# Patient Record
Sex: MALE | Race: WHITE | NOT HISPANIC OR LATINO | Employment: UNEMPLOYED | URBAN - METROPOLITAN AREA
[De-identification: names, ages, dates, MRNs, and addresses within clinical notes are randomized per-mention and may not be internally consistent; named-entity substitution may affect disease eponyms.]

---

## 2024-02-12 ENCOUNTER — OFFICE VISIT (OUTPATIENT)
Dept: PRIMARY CARE | Facility: CLINIC | Age: 24
End: 2024-02-12
Payer: COMMERCIAL

## 2024-02-12 VITALS
OXYGEN SATURATION: 98 % | HEART RATE: 85 BPM | SYSTOLIC BLOOD PRESSURE: 120 MMHG | WEIGHT: 310 LBS | DIASTOLIC BLOOD PRESSURE: 70 MMHG | TEMPERATURE: 97.4 F

## 2024-02-12 DIAGNOSIS — Z01.89 ENCOUNTER FOR ROUTINE LABORATORY TESTING: ICD-10-CM

## 2024-02-12 DIAGNOSIS — R09.A2 SENSATION OF LUMP IN THROAT: Primary | ICD-10-CM

## 2024-02-12 DIAGNOSIS — E66.9 OBESITY WITHOUT SERIOUS COMORBIDITY, UNSPECIFIED CLASSIFICATION, UNSPECIFIED OBESITY TYPE: ICD-10-CM

## 2024-02-12 PROBLEM — N39.44 NOCTURNAL ENURESIS: Status: ACTIVE | Noted: 2024-02-12

## 2024-02-12 PROBLEM — F91.3 OPPOSITIONAL DEFIANT DISORDER: Status: ACTIVE | Noted: 2024-02-12

## 2024-02-12 PROBLEM — M21.42 FLAT FEET, BILATERAL: Status: ACTIVE | Noted: 2024-02-12

## 2024-02-12 PROBLEM — M21.41 FLAT FEET, BILATERAL: Status: ACTIVE | Noted: 2024-02-12

## 2024-02-12 PROBLEM — F32.1 MAJOR DEPRESSIVE DISORDER, SINGLE EPISODE, MODERATE (MULTI): Status: ACTIVE | Noted: 2024-02-12

## 2024-02-12 PROBLEM — F90.0 ADHD, PREDOMINANTLY INATTENTIVE TYPE: Status: ACTIVE | Noted: 2024-02-12

## 2024-02-12 PROCEDURE — 1036F TOBACCO NON-USER: CPT | Performed by: LICENSED PRACTICAL NURSE

## 2024-02-12 PROCEDURE — 99213 OFFICE O/P EST LOW 20 MIN: CPT | Performed by: LICENSED PRACTICAL NURSE

## 2024-02-12 ASSESSMENT — ENCOUNTER SYMPTOMS
DEPRESSION: 0
OCCASIONAL FEELINGS OF UNSTEADINESS: 0
NERVOUS/ANXIOUS: 1
LOSS OF SENSATION IN FEET: 0

## 2024-02-12 ASSESSMENT — PATIENT HEALTH QUESTIONNAIRE - PHQ9
SUM OF ALL RESPONSES TO PHQ9 QUESTIONS 1 AND 2: 0
1. LITTLE INTEREST OR PLEASURE IN DOING THINGS: NOT AT ALL
2. FEELING DOWN, DEPRESSED OR HOPELESS: NOT AT ALL

## 2024-02-12 ASSESSMENT — PAIN SCALES - GENERAL: PAINLEVEL: 0-NO PAIN

## 2024-02-12 NOTE — PATIENT INSTRUCTIONS
It was nice meeting you today Lionel.  I am sorry you have been experiencing this abnormality in your neck. Lets get an ultrasound of your neck and some blood work to get a better look at what's going on. Additionally I will order some blood work to check for diabetes as well and we will see you back in 2 weeks.

## 2024-02-12 NOTE — PROGRESS NOTES
[facility]: [facility]  PROGRESS NOTE      Lionel Roldan Bean is a 23 y.o. male that is presenting today for Establish Care (Pt states that he feels something under his bernardo apples ).      Subjective   Pt presents to the office today for concerns of thyroid enlargement. He notes feeling a lump to the front of his throat and has increased concern for thyroid abnormalities. He shares that he has increased worry about his general health and is not sure if this is anxiety driven, or related to this change he feels in his throat. He denies changes in heat or cold tolerance, his weight, or energy level. He reports no palpitations and notes no changes in his ability to swallow.     Mr. Bean shares that he understands that his increased height to weight ration puts him at risk for chronic medication conditions such as DM. He is interested in having his A1C checked as well.        Review of Systems   Constitutional: Negative.    Endocrine: Negative.         Lump in the throat   Psychiatric/Behavioral:  The patient is nervous/anxious.       Objective   Vitals:    02/12/24 1638   BP: 120/70   Pulse: 85   Temp: 36.3 °C (97.4 °F)   SpO2: 98%      There is no height or weight on file to calculate BMI.  Physical Exam  Constitutional:       General: He is not in acute distress.     Appearance: He is not ill-appearing, toxic-appearing or diaphoretic.   Neck:      Thyroid: No thyroid mass, thyromegaly or thyroid tenderness.   Cardiovascular:      Rate and Rhythm: Normal rate and regular rhythm.   Pulmonary:      Effort: No respiratory distress.      Breath sounds: No wheezing or rales.   Abdominal:      General: Bowel sounds are normal.      Palpations: Abdomen is soft.   Musculoskeletal:      Cervical back: No edema, erythema, signs of trauma, rigidity, tenderness or crepitus. No pain with movement. Normal range of motion.   Lymphadenopathy:      Cervical: No cervical adenopathy.      Right cervical:  "No superficial, deep or posterior cervical adenopathy.     Left cervical: No superficial, deep or posterior cervical adenopathy.   Skin:     General: Skin is warm and dry.       Diagnostic Results   No results found for: \"GLUCOSE\", \"CALCIUM\", \"NA\", \"K\", \"CO2\", \"CL\", \"BUN\", \"CREATININE\"  No results found for: \"ALT\", \"AST\", \"GGT\", \"ALKPHOS\", \"BILITOT\"  No results found for: \"WBC\", \"HGB\", \"HCT\", \"MCV\", \"PLT\"  No results found for: \"CHOL\"  No results found for: \"HDL\"  No results found for: \"LDLCALC\"  No results found for: \"TRIG\"  No components found for: \"CHOLHDL\"  Lab Results   Component Value Date    HGBA1C 5.6 2024     Other labs not included in the list above were reviewed either before or during this encounter.    History    Past Medical History:   Diagnosis Date    Enuresis not due to a substance or known physiological condition 10/25/2013    Enuresis not due to substance or known physiological condition    Laceration without foreign body of unspecified hand, initial encounter 2015    Hand laceration    Personal history of diseases of the skin and subcutaneous tissue 2014    History of carbuncle of skin and subcutaneous tissue    Personal history of other diseases of the respiratory system 2016    History of streptococcal pharyngitis     History reviewed. No pertinent surgical history.  Family History   Problem Relation Name Age of Onset    Hyperthyroidism Mother       Social History     Socioeconomic History    Marital status: Unknown     Spouse name: Not on file    Number of children: Not on file    Years of education: Not on file    Highest education level: Not on file   Occupational History    Not on file   Tobacco Use    Smoking status: Former     Types: Cigarettes     Quit date: 2023     Years since quittin.2    Smokeless tobacco: Never   Vaping Use    Vaping Use: Never used   Substance and Sexual Activity    Alcohol use: Yes     Comment: sometimes    Drug use: Never    Sexual " activity: Not on file   Other Topics Concern    Not on file   Social History Narrative    Not on file     Social Determinants of Health     Financial Resource Strain: Not on file   Food Insecurity: Not on file   Transportation Needs: Not on file   Physical Activity: Not on file   Stress: Not on file   Social Connections: Not on file   Intimate Partner Violence: Not on file   Housing Stability: Not on file     No Known Allergies  No current outpatient medications on file prior to visit.     No current facility-administered medications on file prior to visit.     Immunization History   Administered Date(s) Administered    DTaP vaccine, pediatric  (INFANRIX) 1999, 02/25/2000, 05/10/2000, 05/17/2001, 04/27/2005    HPV, Quadrivalent 08/18/2011, 03/11/2015    Hep A, Unspecified 09/12/2007, 09/23/2008    Hepatitis B vaccine, adult (RECOMBIVAX, ENGERIX) 1999, 02/25/2000, 12/09/2000    HiB PRP-OMP conjugate vaccine, pediatric (PEDVAXHIB) 1999, 02/25/2000, 12/09/2000    MMR vaccine, subcutaneous (MMR II) 12/09/2000, 04/27/2005    Meningococcal MCV4P 08/18/2011, 09/13/2017    Pneumococcal Conjugate PCV 7 12/09/2000, 05/17/2001    Poliovirus vaccine, subcutaneous (IPOL) 1999, 02/25/2000, 05/17/2001, 04/27/2005    Tdap vaccine, age 7 year and older (BOOSTRIX, ADACEL) 08/11/2011, 08/18/2011    Varicella vaccine, subcutaneous (VARIVAX) 12/09/2000, 09/12/2007, 09/23/2008     Patient's medical history was reviewed and updated either before or during this encounter.       Assessment/Plan   Problem List Items Addressed This Visit       Obesity without serious comorbidity     -A1C          Other Visit Diagnoses       Sensation of lump in throat    -  Primary    Encounter for routine laboratory testing        Relevant Orders    Tsh With Reflex To Free T4 If Abnormal (Completed)    Hemoglobin A1C (Completed)        I am not able to palpate abnormality in the neck area. I will order an ultrasound to rule out  thyroid enlargement or some other malformation/abnormality.    I will also order an A1C due to his increased weight.  We will have him follow up in 2 weeks.       Kaitlin Mir, GENNA-CNP

## 2024-02-19 ENCOUNTER — LAB (OUTPATIENT)
Dept: LAB | Facility: LAB | Age: 24
End: 2024-02-19
Payer: COMMERCIAL

## 2024-02-19 ENCOUNTER — HOSPITAL ENCOUNTER (OUTPATIENT)
Dept: RADIOLOGY | Facility: CLINIC | Age: 24
Discharge: HOME | End: 2024-02-19
Payer: COMMERCIAL

## 2024-02-19 DIAGNOSIS — Z01.89 ENCOUNTER FOR ROUTINE LABORATORY TESTING: ICD-10-CM

## 2024-02-19 DIAGNOSIS — R09.A2 SENSATION OF LUMP IN THROAT: ICD-10-CM

## 2024-02-19 LAB
EST. AVERAGE GLUCOSE BLD GHB EST-MCNC: 114 MG/DL
HBA1C MFR BLD: 5.6 %
TSH SERPL DL<=0.05 MIU/L-ACNC: 0.88 MIU/L (ref 0.27–4.2)

## 2024-02-19 PROCEDURE — 84443 ASSAY THYROID STIM HORMONE: CPT

## 2024-02-19 PROCEDURE — 36415 COLL VENOUS BLD VENIPUNCTURE: CPT

## 2024-02-19 PROCEDURE — 76536 US EXAM OF HEAD AND NECK: CPT

## 2024-02-19 PROCEDURE — 83036 HEMOGLOBIN GLYCOSYLATED A1C: CPT

## 2024-02-20 NOTE — RESULT ENCOUNTER NOTE
Called cell number and could not leave message, voice mail not set up yet. Called home number and left message on machine for patient with all test results and to call the office with any questions.

## 2024-02-22 PROBLEM — E66.9 OBESITY WITHOUT SERIOUS COMORBIDITY: Status: ACTIVE | Noted: 2024-02-22

## 2024-02-22 ASSESSMENT — ENCOUNTER SYMPTOMS
ENDOCRINE NEGATIVE: 1
CONSTITUTIONAL NEGATIVE: 1

## 2024-02-27 ENCOUNTER — OFFICE VISIT (OUTPATIENT)
Dept: PRIMARY CARE | Facility: CLINIC | Age: 24
End: 2024-02-27
Payer: COMMERCIAL

## 2024-02-27 VITALS
DIASTOLIC BLOOD PRESSURE: 80 MMHG | SYSTOLIC BLOOD PRESSURE: 130 MMHG | HEART RATE: 88 BPM | TEMPERATURE: 97.6 F | WEIGHT: 313 LBS | OXYGEN SATURATION: 99 %

## 2024-02-27 DIAGNOSIS — E66.9 OBESITY WITHOUT SERIOUS COMORBIDITY, UNSPECIFIED CLASSIFICATION, UNSPECIFIED OBESITY TYPE: ICD-10-CM

## 2024-02-27 DIAGNOSIS — Z01.89 ENCOUNTER FOR ROUTINE LABORATORY TESTING: Primary | ICD-10-CM

## 2024-02-27 DIAGNOSIS — F41.9 ANXIETY: ICD-10-CM

## 2024-02-27 DIAGNOSIS — Z23 ENCOUNTER FOR IMMUNIZATION: ICD-10-CM

## 2024-02-27 DIAGNOSIS — Z00.00 ANNUAL PHYSICAL EXAM: ICD-10-CM

## 2024-02-27 PROBLEM — N39.44 NOCTURNAL ENURESIS: Status: RESOLVED | Noted: 2024-02-12 | Resolved: 2024-02-27

## 2024-02-27 PROBLEM — R09.A2 SENSATION OF LUMP IN THROAT: Status: ACTIVE | Noted: 2024-02-27

## 2024-02-27 PROCEDURE — 1036F TOBACCO NON-USER: CPT | Performed by: LICENSED PRACTICAL NURSE

## 2024-02-27 PROCEDURE — 99214 OFFICE O/P EST MOD 30 MIN: CPT | Performed by: LICENSED PRACTICAL NURSE

## 2024-02-27 ASSESSMENT — PROMIS GLOBAL HEALTH SCALE
RATE_PHYSICAL_HEALTH: FAIR
RATE_AVERAGE_PAIN: 0
CARRYOUT_PHYSICAL_ACTIVITIES: COMPLETELY
RATE_MENTAL_HEALTH: FAIR
RATE_AVERAGE_FATIGUE: MILD
EMOTIONAL_PROBLEMS: OFTEN
RATE_QUALITY_OF_LIFE: FAIR
CARRYOUT_SOCIAL_ACTIVITIES: FAIR
RATE_SOCIAL_SATISFACTION: FAIR
RATE_GENERAL_HEALTH: FAIR

## 2024-02-27 ASSESSMENT — ENCOUNTER SYMPTOMS
DEPRESSION: 0
LOSS OF SENSATION IN FEET: 0
OCCASIONAL FEELINGS OF UNSTEADINESS: 0

## 2024-02-27 ASSESSMENT — PAIN SCALES - GENERAL: PAINLEVEL: 0-NO PAIN

## 2024-02-27 ASSESSMENT — PATIENT HEALTH QUESTIONNAIRE - PHQ9
1. LITTLE INTEREST OR PLEASURE IN DOING THINGS: NOT AT ALL
SUM OF ALL RESPONSES TO PHQ9 QUESTIONS 1 AND 2: 0
2. FEELING DOWN, DEPRESSED OR HOPELESS: NOT AT ALL

## 2024-02-27 NOTE — PROGRESS NOTES
[facility]: [facility]  PHYSICAL EXAM      Lionel Bean is a 23 y.o. male that is presenting today for No chief complaint on file..    Assessment/Plan   Problem List Items Addressed This Visit    None     Subjective   Pt presents to the office today for his annual exam. He denies any physical concerns and shares that he has been doing well since he found out that his A1C, TSH, and thyroid U/S were all WNL. She shares that he does generally have anxiety and that many immediate family members suffer from increased anxiety or worry as well.  He is interested in counseling for this problem. Lionel also shares he understands his increased weight puts him at increased risk for conditions such as type 2 DM and hypertension. He shares that he has been taking walks at night and cooking his meals at home with increased vegetables      Review of Systems   Psychiatric/Behavioral:  The patient is nervous/anxious.    All other systems reviewed and are negative.     Objective   Vitals:    02/27/24 1618   BP: 130/80   Pulse: 88   Temp: 36.4 °C (97.6 °F)   SpO2: 99%     There is no height or weight on file to calculate BMI.  Physical Exam  Vitals reviewed.   Constitutional:       General: He is not in acute distress.     Appearance: He is obese. He is not ill-appearing, toxic-appearing or diaphoretic.   Neck:      Vascular: No carotid bruit or JVD.   Cardiovascular:      Rate and Rhythm: Normal rate and regular rhythm.   Pulmonary:      Effort: No respiratory distress.      Breath sounds: No decreased breath sounds, wheezing, rhonchi or rales.   Abdominal:      General: Bowel sounds are normal.      Palpations: Abdomen is soft. There is no hepatomegaly.      Tenderness: There is no abdominal tenderness.      Hernia: No hernia is present.   Neurological:      Mental Status: He is alert and oriented to person, place, and time.   Psychiatric:         Mood and Affect: Mood is anxious.         Speech:  "Speech is rapid and pressured.      Comments: Intermittent eye contact       Diagnostic Results   No results found for: \"GLUCOSE\", \"CALCIUM\", \"NA\", \"K\", \"CO2\", \"CL\", \"BUN\", \"CREATININE\"  No results found for: \"ALT\", \"AST\", \"GGT\", \"ALKPHOS\", \"BILITOT\"  No results found for: \"WBC\", \"HGB\", \"HCT\", \"MCV\", \"PLT\"  No results found for: \"CHOL\"  No results found for: \"HDL\"  No results found for: \"LDLCALC\"  No results found for: \"TRIG\"  No components found for: \"CHOLHDL\"  Lab Results   Component Value Date    HGBA1C 5.6 2024     Other labs not included in the list above were reviewed either before or during this encounter.    History   Past Medical History:   Diagnosis Date    Enuresis not due to a substance or known physiological condition 10/25/2013    Enuresis not due to substance or known physiological condition    Laceration without foreign body of unspecified hand, initial encounter 2015    Hand laceration    Nocturnal enuresis 2024    Personal history of diseases of the skin and subcutaneous tissue 2014    History of carbuncle of skin and subcutaneous tissue    Personal history of other diseases of the respiratory system 2016    History of streptococcal pharyngitis     No past surgical history on file.  Family History   Problem Relation Name Age of Onset    Hyperthyroidism Mother       Social History     Socioeconomic History    Marital status: Unknown     Spouse name: Not on file    Number of children: Not on file    Years of education: Not on file    Highest education level: Not on file   Occupational History    Not on file   Tobacco Use    Smoking status: Former     Types: Cigarettes     Quit date: 2023     Years since quittin.2    Smokeless tobacco: Never   Vaping Use    Vaping Use: Never used   Substance and Sexual Activity    Alcohol use: Yes     Comment: sometimes    Drug use: Never    Sexual activity: Not on file   Other Topics Concern    Not on file   Social History " Narrative    Not on file     Social Determinants of Health     Financial Resource Strain: Not on file   Food Insecurity: Not on file   Transportation Needs: Not on file   Physical Activity: Not on file   Stress: Not on file   Social Connections: Not on file   Intimate Partner Violence: Not on file   Housing Stability: Not on file     Not on File  Current Outpatient Medications on File Prior to Visit   Medication Sig Dispense Refill    magnesium 30 mg tablet Take 1 tablet (30 mg) by mouth once daily.       No current facility-administered medications on file prior to visit.     Immunization History   Administered Date(s) Administered    DTaP vaccine, pediatric  (INFANRIX) 1999, 02/25/2000, 05/10/2000, 05/17/2001, 04/27/2005    HPV, Quadrivalent 08/18/2011, 03/11/2015    Hep A, Unspecified 09/12/2007, 09/23/2008    Hepatitis B vaccine, adult (RECOMBIVAX, ENGERIX) 1999, 02/25/2000, 12/09/2000    HiB PRP-OMP conjugate vaccine, pediatric (PEDVAXHIB) 1999, 02/25/2000, 12/09/2000    MMR vaccine, subcutaneous (MMR II) 12/09/2000, 04/27/2005    Meningococcal MCV4P 08/18/2011, 09/13/2017    Pneumococcal Conjugate PCV 7 12/09/2000, 05/17/2001    Poliovirus vaccine, subcutaneous (IPOL) 1999, 02/25/2000, 05/17/2001, 04/27/2005    Tdap vaccine, age 7 year and older (BOOSTRIX, ADACEL) 08/11/2011, 08/18/2011    Varicella vaccine, subcutaneous (VARIVAX) 12/09/2000, 09/12/2007, 09/23/2008     Patient's medical history was reviewed and updated either before or during this encounter.     Lionel's VS are noted. He shares his BP is slightly elevated from drinking a caffeinated beverage that typically causes this change. He is doing well today.     He is not interested in HIV or Hep C screening at this time. He also would like to decline a Covid 19 vaccination.     He notes he has received his Influenza Vaccination earlier in the season. He was interested in reciving a Tdap vaccination, but he left the office  before the shot could be given.     I have placed a referral to Psychology for evaluation and treatment. I will order his annual blood work. Lionel shares that he will continue working on his eating and exercise habits. We will see him back in 1 year.      Kaitlin Mir, GENNA-CNP

## 2024-02-28 PROBLEM — F32.A ANXIETY AND DEPRESSION: Status: ACTIVE | Noted: 2024-02-28

## 2024-02-28 PROBLEM — F41.9 ANXIETY AND DEPRESSION: Status: ACTIVE | Noted: 2024-02-28

## 2024-02-28 ASSESSMENT — ENCOUNTER SYMPTOMS: NERVOUS/ANXIOUS: 1

## 2024-02-28 NOTE — ASSESSMENT & PLAN NOTE
-Referral to Psychology for therapy services, he is not interested in pharmacologic treatment at this time.

## 2025-03-04 ENCOUNTER — APPOINTMENT (OUTPATIENT)
Dept: PRIMARY CARE | Facility: CLINIC | Age: 25
End: 2025-03-04
Payer: COMMERCIAL

## 2025-03-06 NOTE — PROGRESS NOTES
"[facility]: [facility]  PHYSICAL EXAM      Lionel Bean is a 24 y.o. male that is presenting today for annual physical exam    Assessment/Plan   {Assess/Plan SmartLinks (Optional):83823}  Subjective   HPI  Review of Systems   Objective   There were no vitals filed for this visit.  There is no height or weight on file to calculate BMI.  Physical Exam  Diagnostic Results   No results found for: \"GLUCOSE\", \"CALCIUM\", \"NA\", \"K\", \"CO2\", \"CL\", \"BUN\", \"CREATININE\"  No results found for: \"ALT\", \"AST\", \"GGT\", \"ALKPHOS\", \"BILITOT\"  No results found for: \"WBC\", \"HGB\", \"HCT\", \"MCV\", \"PLT\"  No results found for: \"CHOL\"  No results found for: \"HDL\"  No results found for: \"LDLCALC\"  No results found for: \"TRIG\"  No components found for: \"CHOLHDL\"  Lab Results   Component Value Date    HGBA1C 5.6 02/19/2024     Other labs not included in the list above were reviewed either before or during this encounter.    History   Past Medical History:   Diagnosis Date    Enuresis not due to a substance or known physiological condition 10/25/2013    Enuresis not due to substance or known physiological condition    Laceration without foreign body of unspecified hand, initial encounter 02/28/2015    Hand laceration    Nocturnal enuresis 02/12/2024    Personal history of diseases of the skin and subcutaneous tissue 01/17/2014    History of carbuncle of skin and subcutaneous tissue    Personal history of other diseases of the respiratory system 07/01/2016    History of streptococcal pharyngitis     No past surgical history on file.  Family History   Problem Relation Name Age of Onset    Hyperthyroidism Mother       Social History     Socioeconomic History    Marital status: Unknown     Spouse name: Not on file    Number of children: Not on file    Years of education: Not on file    Highest education level: Not on file   Occupational History    Not on file   Tobacco Use    Smoking status: Former     Current " packs/day: 0.00     Types: Cigarettes     Quit date: 2023     Years since quittin.2    Smokeless tobacco: Never   Vaping Use    Vaping status: Never Used   Substance and Sexual Activity    Alcohol use: Yes     Comment: sometimes    Drug use: Never    Sexual activity: Not on file   Other Topics Concern    Not on file   Social History Narrative    Not on file     Social Drivers of Health     Financial Resource Strain: Not on file   Food Insecurity: Not on file   Transportation Needs: Not on file   Physical Activity: Not on file   Stress: Not on file   Social Connections: Not on file   Intimate Partner Violence: Not on file   Housing Stability: Not on file     No Known Allergies  Current Outpatient Medications on File Prior to Visit   Medication Sig Dispense Refill    magnesium 30 mg tablet Take 1 tablet (30 mg) by mouth once daily.       No current facility-administered medications on file prior to visit.     Immunization History   Administered Date(s) Administered    DTaP vaccine, pediatric  (INFANRIX) 1999, 2000, 05/10/2000, 2001, 2005    HPV, Quadrivalent 2011, 2015    Hep A, Unspecified 2007, 2008    Hepatitis B vaccine, adult *Check Product/Dose* 1999, 2000, 2000    HiB PRP-OMP conjugate vaccine, pediatric (PEDVAXHIB) 1999, 2000, 2000    MMR vaccine, subcutaneous (MMR II) 2000, 2005    Meningococcal ACWY-D (Menactra) 4-valent conjugate vaccine 2011, 2017    Pneumococcal Conjugate PCV 7 2000, 2001    Poliovirus vaccine, subcutaneous (IPOL) 1999, 2000, 2001, 2005    Tdap vaccine, age 7 year and older (BOOSTRIX, ADACEL) 2011, 2011    Varicella vaccine, subcutaneous (VARIVAX) 2000, 2007, 2008     Patient's medical history was reviewed and updated either before or during this encounter.       Lianne Romero, APRN-CNP

## 2025-03-07 ENCOUNTER — APPOINTMENT (OUTPATIENT)
Dept: PRIMARY CARE | Facility: CLINIC | Age: 25
End: 2025-03-07
Payer: COMMERCIAL

## 2025-03-07 DIAGNOSIS — F90.0 ATTENTION DEFICIT HYPERACTIVITY DISORDER (ADHD), PREDOMINANTLY INATTENTIVE TYPE: ICD-10-CM

## 2025-03-07 DIAGNOSIS — E66.9 OBESITY WITHOUT SERIOUS COMORBIDITY, UNSPECIFIED CLASS, UNSPECIFIED OBESITY TYPE: ICD-10-CM

## 2025-03-07 DIAGNOSIS — F41.9 ANXIETY AND DEPRESSION: ICD-10-CM

## 2025-03-07 DIAGNOSIS — F32.A ANXIETY AND DEPRESSION: ICD-10-CM

## 2025-03-07 DIAGNOSIS — Z00.00 ANNUAL PHYSICAL EXAM: Primary | ICD-10-CM
